# Patient Record
Sex: MALE | Race: WHITE | NOT HISPANIC OR LATINO | Employment: FULL TIME | URBAN - METROPOLITAN AREA
[De-identification: names, ages, dates, MRNs, and addresses within clinical notes are randomized per-mention and may not be internally consistent; named-entity substitution may affect disease eponyms.]

---

## 2024-07-22 ENCOUNTER — APPOINTMENT (OUTPATIENT)
Facility: HOSPITAL | Age: 54
End: 2024-07-22
Payer: COMMERCIAL

## 2024-07-22 ENCOUNTER — HOSPITAL ENCOUNTER (EMERGENCY)
Facility: HOSPITAL | Age: 54
Discharge: HOME | End: 2024-07-22
Attending: EMERGENCY MEDICINE | Admitting: EMERGENCY MEDICINE
Payer: COMMERCIAL

## 2024-07-22 VITALS
TEMPERATURE: 98.9 F | RESPIRATION RATE: 20 BRPM | DIASTOLIC BLOOD PRESSURE: 92 MMHG | HEIGHT: 69 IN | SYSTOLIC BLOOD PRESSURE: 133 MMHG | BODY MASS INDEX: 34.87 KG/M2 | OXYGEN SATURATION: 99 % | WEIGHT: 235.4 LBS | HEART RATE: 71 BPM

## 2024-07-22 DIAGNOSIS — L03.115 CELLULITIS OF RIGHT LOWER EXTREMITY WITHOUT FOOT: Primary | ICD-10-CM

## 2024-07-22 LAB
ALBUMIN SERPL-MCNC: 4 G/DL (ref 3.5–5.2)
ALBUMIN/GLOB SERPL: 1.1 G/DL
ALP SERPL-CCNC: 120 U/L (ref 39–117)
ALT SERPL W P-5'-P-CCNC: 38 U/L (ref 1–41)
ANION GAP SERPL CALCULATED.3IONS-SCNC: 12.6 MMOL/L (ref 5–15)
AST SERPL-CCNC: 29 U/L (ref 1–40)
BASOPHILS # BLD AUTO: 0.07 10*3/MM3 (ref 0–0.2)
BASOPHILS NFR BLD AUTO: 0.6 % (ref 0–1.5)
BILIRUB SERPL-MCNC: 0.8 MG/DL (ref 0–1.2)
BUN SERPL-MCNC: 13 MG/DL (ref 6–20)
BUN/CREAT SERPL: 15.9 (ref 7–25)
CALCIUM SPEC-SCNC: 9.5 MG/DL (ref 8.6–10.5)
CHLORIDE SERPL-SCNC: 103 MMOL/L (ref 98–107)
CO2 SERPL-SCNC: 24.4 MMOL/L (ref 22–29)
CREAT SERPL-MCNC: 0.82 MG/DL (ref 0.76–1.27)
DEPRECATED RDW RBC AUTO: 44.2 FL (ref 37–54)
EGFRCR SERPLBLD CKD-EPI 2021: 103.7 ML/MIN/1.73
EOSINOPHIL # BLD AUTO: 0.11 10*3/MM3 (ref 0–0.4)
EOSINOPHIL NFR BLD AUTO: 0.9 % (ref 0.3–6.2)
ERYTHROCYTE [DISTWIDTH] IN BLOOD BY AUTOMATED COUNT: 12.9 % (ref 12.3–15.4)
GLOBULIN UR ELPH-MCNC: 3.6 GM/DL
GLUCOSE SERPL-MCNC: 116 MG/DL (ref 65–99)
HCT VFR BLD AUTO: 38.7 % (ref 37.5–51)
HGB BLD-MCNC: 13.2 G/DL (ref 13–17.7)
HOLD SPECIMEN: NORMAL
IMM GRANULOCYTES # BLD AUTO: 0.03 10*3/MM3 (ref 0–0.05)
IMM GRANULOCYTES NFR BLD AUTO: 0.2 % (ref 0–0.5)
LYMPHOCYTES # BLD AUTO: 2.31 10*3/MM3 (ref 0.7–3.1)
LYMPHOCYTES NFR BLD AUTO: 18.9 % (ref 19.6–45.3)
MCH RBC QN AUTO: 31.7 PG (ref 26.6–33)
MCHC RBC AUTO-ENTMCNC: 34.1 G/DL (ref 31.5–35.7)
MCV RBC AUTO: 93 FL (ref 79–97)
MONOCYTES # BLD AUTO: 1.31 10*3/MM3 (ref 0.1–0.9)
MONOCYTES NFR BLD AUTO: 10.7 % (ref 5–12)
NEUTROPHILS NFR BLD AUTO: 68.7 % (ref 42.7–76)
NEUTROPHILS NFR BLD AUTO: 8.37 10*3/MM3 (ref 1.7–7)
PLATELET # BLD AUTO: 328 10*3/MM3 (ref 140–450)
PMV BLD AUTO: 10.5 FL (ref 6–12)
POTASSIUM SERPL-SCNC: 4 MMOL/L (ref 3.5–5.2)
PROT SERPL-MCNC: 7.6 G/DL (ref 6–8.5)
RBC # BLD AUTO: 4.16 10*6/MM3 (ref 4.14–5.8)
SODIUM SERPL-SCNC: 140 MMOL/L (ref 136–145)
WBC NRBC COR # BLD AUTO: 12.2 10*3/MM3 (ref 3.4–10.8)
WHOLE BLOOD HOLD COAG: NORMAL
WHOLE BLOOD HOLD SPECIMEN: NORMAL

## 2024-07-22 PROCEDURE — 85025 COMPLETE CBC W/AUTO DIFF WBC: CPT | Performed by: PHYSICIAN ASSISTANT

## 2024-07-22 PROCEDURE — 99283 EMERGENCY DEPT VISIT LOW MDM: CPT

## 2024-07-22 PROCEDURE — 96365 THER/PROPH/DIAG IV INF INIT: CPT

## 2024-07-22 PROCEDURE — 25010000002 CEFAZOLIN PER 500 MG: Performed by: PHYSICIAN ASSISTANT

## 2024-07-22 PROCEDURE — 80053 COMPREHEN METABOLIC PANEL: CPT | Performed by: PHYSICIAN ASSISTANT

## 2024-07-22 PROCEDURE — 73560 X-RAY EXAM OF KNEE 1 OR 2: CPT

## 2024-07-22 RX ORDER — SODIUM CHLORIDE 0.9 % (FLUSH) 0.9 %
10 SYRINGE (ML) INJECTION AS NEEDED
Status: DISCONTINUED | OUTPATIENT
Start: 2024-07-22 | End: 2024-07-22 | Stop reason: HOSPADM

## 2024-07-22 RX ORDER — CEPHALEXIN 500 MG/1
500 CAPSULE ORAL 4 TIMES DAILY
Qty: 28 CAPSULE | Refills: 0 | Status: SHIPPED | OUTPATIENT
Start: 2024-07-22

## 2024-07-22 RX ADMIN — SODIUM CHLORIDE 2000 MG: 900 INJECTION INTRAVENOUS at 14:56

## 2024-07-22 NOTE — FSED PROVIDER NOTE
"Subjective  History of Present Illness:    54-year-old male with past medical history of hypertension hyperlipidemia presents to ED for evaluation of right lower extremity redness and swelling.  Patient states that he developed some redness to the right anterior knee last Wednesday.  The restlessness has began to spread distally down the lower extremities involving his lower leg but does not extend to the ankle and foot.  Feels warm and painful to light touch.  Denies any painful or decreased range of motion of the knee joint itself.  No fevers.  Patient is not diabetic.  Patient does note that the symptoms started after he was cleaning an apartment complex that did have flea infestation.    Nurses Notes reviewed and agree, including vitals, allergies, social history and prior medical history.     REVIEW OF SYSTEMS: All systems reviewed and not pertinent unless noted.  Review of Systems   Musculoskeletal:         Redness and swelling of right knee and lower leg   Skin:  Positive for color change.   All other systems reviewed and are negative.      Past Medical History:   Diagnosis Date    Hyperlipidemia     Hypertension        Allergies:    Patient has no known allergies.      History reviewed. No pertinent surgical history.      Social History     Socioeconomic History    Marital status: Single   Tobacco Use    Smoking status: Never   Substance and Sexual Activity    Alcohol use: Not Currently    Drug use: Never    Sexual activity: Yes         History reviewed. No pertinent family history.    Objective  Physical Exam:  /81   Pulse 68   Temp 98.9 °F (37.2 °C) (Oral)   Resp 20   Ht 175.3 cm (69\")   Wt 107 kg (235 lb 6.4 oz)   SpO2 95%   BMI 34.76 kg/m²      Physical Exam  Vitals and nursing note reviewed.   Constitutional:       General: He is not in acute distress.  HENT:      Head: Normocephalic and atraumatic.   Cardiovascular:      Rate and Rhythm: Normal rate and regular rhythm.   Pulmonary:      " Effort: Pulmonary effort is normal. No respiratory distress.      Breath sounds: Normal breath sounds.   Musculoskeletal:      Left knee: Normal.      Comments: RLE: Mild erythema noted to the anterior knee beginning mid patella extending to anterior portion of lower leg.  Does not extend to ankle or foot.  Associated soft tissue swelling.  Warm to touch. Range of motion of the knee is full and painless.   Neurological:      Mental Status: He is alert.      Comments: Awake and alert   Psychiatric:         Mood and Affect: Mood normal.         Behavior: Behavior normal.         Procedures    ED Course:    ED Course as of 07/22/24 1453   Mon Jul 22, 2024   1432 WBC(!): 12.20 [AL]   1432 Hemoglobin: 13.2 [AL]   1432 Hematocrit: 38.7 [AL]   1433 Glucose(!): 116 [AL]      ED Course User Index  [AL] Latoya Pollock PA-C       Lab Results (last 24 hours)       Procedure Component Value Units Date/Time    CBC Auto Differential [[id number]]  (Abnormal) Collected: 07/22/24 1343    Specimen: Blood Updated: 07/22/24 1417     WBC 12.20 10*3/mm3      RBC 4.16 10*6/mm3      Hemoglobin 13.2 g/dL      Hematocrit 38.7 %      MCV 93.0 fL      MCH 31.7 pg      MCHC 34.1 g/dL      RDW 12.9 %      RDW-SD 44.2 fl      MPV 10.5 fL      Platelets 328 10*3/mm3      Neutrophil % 68.7 %      Lymphocyte % 18.9 %      Monocyte % 10.7 %      Eosinophil % 0.9 %      Basophil % 0.6 %      Immature Grans % 0.2 %      Neutrophils, Absolute 8.37 10*3/mm3      Lymphocytes, Absolute 2.31 10*3/mm3      Monocytes, Absolute 1.31 10*3/mm3      Eosinophils, Absolute 0.11 10*3/mm3      Basophils, Absolute 0.07 10*3/mm3      Immature Grans, Absolute 0.03 10*3/mm3     Comprehensive Metabolic Panel [[id number]]  (Abnormal) Collected: 07/22/24 1343    Specimen: Blood Updated: 07/22/24 1431     Glucose 116 mg/dL      BUN 13 mg/dL      Creatinine 0.82 mg/dL      Sodium 140 mmol/L      Potassium 4.0 mmol/L      Chloride 103 mmol/L      CO2 24.4 mmol/L      Calcium  9.5 mg/dL      Total Protein 7.6 g/dL      Albumin 4.0 g/dL      ALT (SGPT) 38 U/L      AST (SGOT) 29 U/L      Alkaline Phosphatase 120 U/L      Total Bilirubin 0.8 mg/dL      Globulin 3.6 gm/dL      A/G Ratio 1.1 g/dL      BUN/Creatinine Ratio 15.9     Anion Gap 12.6 mmol/L      eGFR 103.7 mL/min/1.73     Narrative:      GFR Normal >60  Chronic Kidney Disease <60  Kidney Failure <15               XR Knee 1 or 2 View Right    Result Date: 7/22/2024  XR KNEE 1 OR 2 VW RIGHT Date of Exam: 7/22/2024 2:00 PM EDT Indication: R knee pain Comparison: None available. Findings: Mild to moderate joint space narrowing in the medial and patellofemoral compartments. Trace suprapatellar effusion. No acute fracture or dislocation. No gross osseous lesion or periosteal reaction.     Impression: Impression: Mild to moderate degenerative changes in the medial and patellofemoral compartments. Electronically Signed: Jagdish Harrell MD  7/22/2024 2:20 PM EDT  Workstation ID: [id number]        MDM     Amount and/or Complexity of Data Reviewed  Clinical lab tests: reviewed  Tests in the radiology section of CPT®: reviewed        54-year-old male presents to ED for evaluation of redness and swelling to the right lower extremity.  Symptoms started last week on Wednesday and have been progressive in nature.  Refer to HPI for further details.  Differential includes cellulitis, septic joint, abscess.  Upon exam patient has some mild erythema noted to the anterior knee beginning mid patella extending to anterior portion of lower leg.  Does not extend to ankle or foot.  Associated soft tissue swelling.  Warm to touch. Range of motion of the knee is full and painless.  Exam appears most consistent with superficial cellulitis but given site septic joint is included in the differential.  I did evaluate the patient at bedside with my attending, Dr. Augustin Guadalupe. He agreed presentation appeared most consistent with cellulits and recommended basic labs  and IV ancef to begin treatment.  Mild leukocytosis of 12.2. Will discharge home on oral cephalex. Extensive discussion at bedside with patient regarding return precautions for any worsening symptoms or concerns including, but not limited to, fevers, increasing redness, decreased under motion of the right knee itself.    -----  ED Disposition       ED Disposition   Discharge    Condition   Stable    Comment   --             Final diagnoses:   Cellulitis of right lower extremity without foot      Your Follow-Up Providers       Schedule an appointment as soon as possible for a visit  with [facility].    Follow up details: for reevaluation later this week.  [facility] [address]  [phone]             Go to  [facility] EMERGENCY DEPARTMENT [place].    Specialty: Emergency Medicine  Follow up details: If symptoms worsen (including but not limited to) fevers, increasing redness, decreased range of motion of the joint, any other worsening symptoms or concerns.  [address]  [facility] [address] [phone]                     Contact information for after-discharge care    Follow-up information has not been specified.                    Your medication list        START taking these medications        Instructions Last Dose Given Next Dose Due   cephalexin 500 MG capsule  Commonly known as: KEFLEX      Take 1 capsule by mouth 4 (Four) Times a Day.                 Where to Get Your Medications        These medications were sent to [facility] [id number] - [place] [address] AT [facility] [address] - [phone]  - [phone] [place] [address], [facility] [address]      Phone: [phone]   cephalexin 500 MG capsule

## 2024-07-22 NOTE — Clinical Note
[facility] EMERGENCY DEPARTMENT [place]  [address]  [facility] [address]  Phone: [phone]  Fax: [phone]    Edmond Ramos was seen and treated in our emergency department on 7/22/2024.  He may return to work on 07/25/2024.         Thank you for choosing [facility].    Franchesca Yoder RN